# Patient Record
Sex: FEMALE | Race: ASIAN | NOT HISPANIC OR LATINO
[De-identification: names, ages, dates, MRNs, and addresses within clinical notes are randomized per-mention and may not be internally consistent; named-entity substitution may affect disease eponyms.]

---

## 2022-09-28 ENCOUNTER — APPOINTMENT (OUTPATIENT)
Dept: GASTROENTEROLOGY | Facility: CLINIC | Age: 57
End: 2022-09-28

## 2022-09-28 VITALS
WEIGHT: 245 LBS | SYSTOLIC BLOOD PRESSURE: 120 MMHG | HEIGHT: 68 IN | OXYGEN SATURATION: 98 % | HEART RATE: 75 BPM | DIASTOLIC BLOOD PRESSURE: 72 MMHG | TEMPERATURE: 97.1 F | BODY MASS INDEX: 37.13 KG/M2 | RESPIRATION RATE: 12 BRPM

## 2022-09-28 DIAGNOSIS — Z87.42 PERSONAL HISTORY OF OTHER DISEASES OF THE FEMALE GENITAL TRACT: ICD-10-CM

## 2022-09-28 DIAGNOSIS — Z86.79 PERSONAL HISTORY OF OTHER DISEASES OF THE CIRCULATORY SYSTEM: ICD-10-CM

## 2022-09-28 DIAGNOSIS — K64.8 OTHER HEMORRHOIDS: ICD-10-CM

## 2022-09-28 DIAGNOSIS — Z86.39 PERSONAL HISTORY OF OTHER ENDOCRINE, NUTRITIONAL AND METABOLIC DISEASE: ICD-10-CM

## 2022-09-28 PROCEDURE — 99204 OFFICE O/P NEW MOD 45 MIN: CPT

## 2022-09-28 RX ORDER — SODIUM SULFATE, POTASSIUM SULFATE AND MAGNESIUM SULFATE 1.6; 3.13; 17.5 G/177ML; G/177ML; G/177ML
17.5-3.13-1.6 SOLUTION ORAL
Qty: 1 | Refills: 0 | Status: ACTIVE | COMMUNITY
Start: 2022-09-28 | End: 1900-01-01

## 2022-09-28 NOTE — CONSULT LETTER
[Dear  ___] : Dear  [unfilled], [Consult Letter:] : I had the pleasure of evaluating your patient, [unfilled]. [Please see my note below.] : Please see my note below. [Consult Closing:] : Thank you very much for allowing me to participate in the care of this patient.  If you have any questions, please do not hesitate to contact me. [FreeTextEntry2] : Dr.Maroo Castro

## 2022-09-28 NOTE — PHYSICAL EXAM
[Normal] : heart rate was normal and rhythm regular, normal S1 and S2, no murmurs [No Masses] : no abdominal mass palpated [No Hernia] : no hernia [Ascites] : no ascites [Rebound Tenderness] : no rebound tenderness [de-identified] : Mild lower abdominal tenderness [de-identified] : Deferred

## 2022-09-28 NOTE — ASSESSMENT
[FreeTextEntry1] : 57-year-old female with history of acid reflux, history of peptic ulcer disease with bleeding duodenal ulcer resulting in melena, and history of rectal bleeding secondary to hemorrhoids complaining of lower abdominal tenderness and diarrhea.  We will schedule patient for both upper endoscopy and colonoscopy.  We will request medical clearance prior to procedures.

## 2022-09-28 NOTE — REVIEW OF SYSTEMS
[Abdominal Pain] : abdominal pain [Diarrhea] : diarrhea [Heartburn] : heartburn [Bloating (gassiness)] : bloating [Vomiting] : no vomiting [Constipation] : no constipation [Melena (black stool)] : no melena [Bleeding] : no bleeding [Fecal Incontinence (soiling)] : no fecal incontinence

## 2022-09-28 NOTE — REASON FOR VISIT
[Initial Evaluation] : an initial evaluation [FreeTextEntry1] : 57-year-old female presents with complaints of acid reflux and diarrhea

## 2022-09-28 NOTE — HISTORY OF PRESENT ILLNESS
[de-identified] : Upper endoscopy performed  approximately 10 years ago revealed a duodenal ulcer and a hiatal hernia.  Patient had melena at the time. [FreeTextEntry1] : Colonoscopy performed in [facility] approximately 10 years ago revealed hemorrhoids

## 2022-09-29 RX ORDER — SODIUM SULFATE, POTASSIUM SULFATE AND MAGNESIUM SULFATE 1.6; 3.13; 17.5 G/177ML; G/177ML; G/177ML
17.5-3.13-1.6 SOLUTION ORAL
Qty: 1 | Refills: 0 | Status: ACTIVE | COMMUNITY
Start: 2022-09-29 | End: 1900-01-01

## 2022-10-03 ENCOUNTER — APPOINTMENT (OUTPATIENT)
Dept: ORTHOPEDIC SURGERY | Facility: CLINIC | Age: 57
End: 2022-10-03

## 2022-10-03 VITALS — WEIGHT: 245 LBS | BODY MASS INDEX: 37.13 KG/M2 | HEIGHT: 68 IN

## 2022-10-03 DIAGNOSIS — Z00.00 ENCOUNTER FOR GENERAL ADULT MEDICAL EXAMINATION W/OUT ABNORMAL FINDINGS: ICD-10-CM

## 2022-10-03 DIAGNOSIS — M75.31 CALCIFIC TENDINITIS OF RIGHT SHOULDER: ICD-10-CM

## 2022-10-03 PROCEDURE — 99204 OFFICE O/P NEW MOD 45 MIN: CPT

## 2022-10-03 PROCEDURE — 73080 X-RAY EXAM OF ELBOW: CPT | Mod: RT

## 2022-10-03 PROCEDURE — 73010 X-RAY EXAM OF SHOULDER BLADE: CPT | Mod: RT

## 2022-10-03 PROCEDURE — 73030 X-RAY EXAM OF SHOULDER: CPT | Mod: RT

## 2022-10-03 NOTE — IMAGING
[Loose body] : Loose body [Right] : right elbow [There are no fractures, subluxations or dislocations. No significant abnormalities are seen] : There are no fractures, subluxations or dislocations. No significant abnormalities are seen [de-identified] : RIGHT SHOULDER\par No swelling, no ecchymosis, no deformity, no scapular winging.\par TTP TRAP AND LATERAL SHOULDER\par Forward flexion to 170; external rotation to 90 degrees (with shoulder abducted); internal rotation to 70 degrees (with shoulder abducted) WITH PAIN\par 5/5 supraspinatus, infraspinatus and subscapularis WITH DISCOMFORT\par Negative Ragsdale test, POSITIVE impingement sign, negative O’Norberto test.\par Speed’s and yergason negative\par Motor and sensory intact distally\par \par RIGHT ELBOW\par No swelling, no ecchymosis, no warmth, no fluctuance.\par MEDIAL AND LATERAL EPICONDYLE TTP\par Full elbow flexion, extension, supination, pronation\par 5/5 strength in flexion, extension, supination, pronation\par No Varus or Valgus instability\par Motor and sensory intact distally\par \par

## 2022-10-03 NOTE — ASSESSMENT
[FreeTextEntry1] : ATRAUMATIC RIGHT SHOULDER AND ELBOW PAIN, N/T IN HAND\par H/O RCR 2014\par XRAYS ELBOW NEGATIVE\par LOOSE BODYOR CALCIFIC DENSITY NOTED  ON SHOULDER XRAYS\par MRI RIGHT SHOULDER TO FURTHER EVAL LOOSE BODY/ RE-TEAR\par EMG TO FURTHER EVAL RADIC VS CARPAL TUNNEL AS SHE REPORTS WEAKNESS IN BOTH HANDS\par MDP DISCUSSED, PT DECLINED\par RTO P IMAGING/EMG

## 2022-10-03 NOTE — HISTORY OF PRESENT ILLNESS
[Gradual] : gradual [9] : 9 [8] : 8 [Localized] : localized [Intermittent] : intermittent [Nothing helps with pain getting better] : Nothing helps with pain getting better [Lying in bed] : lying in bed [de-identified] : 10/03/2022 pt presents here today with right shoulders pain\par h/o right shoulder rct repair  2014 june \par pain also in right elbow\par has N/T in fingers and feels she is losing dexterity and drops things often [] : no [FreeTextEntry1] : bilateral shoulders  [de-identified] : Dr Mitchell [de-identified] : sx

## 2022-10-22 ENCOUNTER — APPOINTMENT (OUTPATIENT)
Dept: MRI IMAGING | Facility: CLINIC | Age: 57
End: 2022-10-22

## 2022-10-28 ENCOUNTER — FORM ENCOUNTER (OUTPATIENT)
Age: 57
End: 2022-10-28

## 2022-10-29 ENCOUNTER — APPOINTMENT (OUTPATIENT)
Dept: MRI IMAGING | Facility: CLINIC | Age: 57
End: 2022-10-29

## 2022-10-29 ENCOUNTER — APPOINTMENT (OUTPATIENT)
Dept: MAMMOGRAPHY | Facility: CLINIC | Age: 57
End: 2022-10-29

## 2022-10-29 PROCEDURE — 77067 SCR MAMMO BI INCL CAD: CPT

## 2022-10-29 PROCEDURE — 73221 MRI JOINT UPR EXTREM W/O DYE: CPT | Mod: RT

## 2022-10-29 PROCEDURE — 77063 BREAST TOMOSYNTHESIS BI: CPT

## 2022-11-08 ENCOUNTER — APPOINTMENT (OUTPATIENT)
Dept: NEUROLOGY | Facility: CLINIC | Age: 57
End: 2022-11-08

## 2022-11-08 DIAGNOSIS — R20.0 ANESTHESIA OF SKIN: ICD-10-CM

## 2022-11-08 DIAGNOSIS — M54.12 RADICULOPATHY, CERVICAL REGION: ICD-10-CM

## 2022-11-08 DIAGNOSIS — R20.2 ANESTHESIA OF SKIN: ICD-10-CM

## 2022-11-08 PROCEDURE — 95886 MUSC TEST DONE W/N TEST COMP: CPT

## 2022-11-08 PROCEDURE — 95912 NRV CNDJ TEST 11-12 STUDIES: CPT

## 2022-11-11 ENCOUNTER — APPOINTMENT (OUTPATIENT)
Dept: ORTHOPEDIC SURGERY | Facility: CLINIC | Age: 57
End: 2022-11-11

## 2022-11-11 VITALS — HEIGHT: 68 IN | BODY MASS INDEX: 37.13 KG/M2 | WEIGHT: 245 LBS

## 2022-11-11 DIAGNOSIS — Z98.890 OTHER SPECIFIED POSTPROCEDURAL STATES: ICD-10-CM

## 2022-11-11 PROCEDURE — 99213 OFFICE O/P EST LOW 20 MIN: CPT

## 2022-11-11 NOTE — HISTORY OF PRESENT ILLNESS
[Gradual] : gradual [9] : 9 [8] : 8 [Localized] : localized [Intermittent] : intermittent [Nothing helps with pain getting better] : Nothing helps with pain getting better [Lying in bed] : lying in bed [de-identified] : 10/03/2022 pt presents here today with right shoulders pain\par h/o right shoulder rct repair  2014 june \par pain also in right elbow\par has N/T in fingers and feels she is losing dexterity and drops things often [] : no [FreeTextEntry1] : bilateral shoulders  [de-identified] : Dr Mitchell [de-identified] : sx

## 2022-11-11 NOTE — ASSESSMENT
[FreeTextEntry1] : reviewed shoulder mri, no re-tear, nothing focal noted\par Advised consultation with hand team regarding CTS, persistent n/t and also dropping things\par f/u PRN for shoulder

## 2022-11-11 NOTE — IMAGING
[Loose body] : Loose body [Right] : right elbow [There are no fractures, subluxations or dislocations. No significant abnormalities are seen] : There are no fractures, subluxations or dislocations. No significant abnormalities are seen [de-identified] : RIGHT SHOULDER\par No swelling, no ecchymosis, no deformity, no scapular winging.\par TTP TRAP AND LATERAL SHOULDER\par Forward flexion to 170; external rotation to 90 degrees (with shoulder abducted); internal rotation to 70 degrees (with shoulder abducted) WITH PAIN\par 5/5 supraspinatus, infraspinatus and subscapularis WITH DISCOMFORT\par Negative Ragsdale test, POSITIVE impingement sign, negative O’Norberto test.\par Speed’s and yergason negative\par Motor and sensory intact distally\par +TINEL wirst, +spurling\par

## 2022-12-02 ENCOUNTER — APPOINTMENT (OUTPATIENT)
Dept: ORTHOPEDIC SURGERY | Facility: CLINIC | Age: 57
End: 2022-12-02

## 2022-12-02 VITALS — BODY MASS INDEX: 37.13 KG/M2 | WEIGHT: 245 LBS | HEIGHT: 68 IN

## 2022-12-02 DIAGNOSIS — G56.01 CARPAL TUNNEL SYNDROME, RIGHT UPPER LIMB: ICD-10-CM

## 2022-12-02 DIAGNOSIS — G56.02 CARPAL TUNNEL SYNDROME, LEFT UPPER LIMB: ICD-10-CM

## 2022-12-02 PROCEDURE — 99214 OFFICE O/P EST MOD 30 MIN: CPT

## 2022-12-02 NOTE — DISCUSSION/SUMMARY
[Surgical risks reviewed] : Surgical risks reviewed [de-identified] : Patient wished to proceed with bilateral CTR.  Will be scheduled for LET CTR, 3 weeks later RIGHT CTR

## 2022-12-02 NOTE — HISTORY OF PRESENT ILLNESS
[5] : 5 [9] : 9 [Dull/Aching] : dull/aching [Localized] : localized [Sharp] : sharp [Full time] : Work status: full time [de-identified] : 57 year old female with pain, numbness and stiffness for a few years.  Gradual builkd up.  NO specific trauma.   SYmptoms are worse at night.  SLeep is interrupted.  Has tried splinting at night for a few months.  Not much help.  Symptoms are intermittent.    [] : Post Surgical Visit: no [FreeTextEntry1] : B/L Hands [FreeTextEntry3] : N/A Chronic [FreeTextEntry5] : 58 Y/O RHD F eval B/L Hands Attarumatic chronic pain unknown DX prior TX of XR and EMG by Dr ACE Saldivar  [de-identified] : 11/11/22 [de-identified] : Dr ACE Saldivar  [de-identified] : XR EMG  [de-identified] : XR EMG  [de-identified] :

## 2023-01-02 ENCOUNTER — NON-APPOINTMENT (OUTPATIENT)
Age: 58
End: 2023-01-02

## 2023-01-04 ENCOUNTER — APPOINTMENT (OUTPATIENT)
Age: 58
End: 2023-01-04

## 2023-01-17 ENCOUNTER — APPOINTMENT (OUTPATIENT)
Dept: ORTHOPEDIC SURGERY | Facility: CLINIC | Age: 58
End: 2023-01-17

## 2023-06-13 ENCOUNTER — APPOINTMENT (OUTPATIENT)
Dept: OBGYN | Facility: CLINIC | Age: 58
End: 2023-06-13

## 2023-06-16 ENCOUNTER — NON-APPOINTMENT (OUTPATIENT)
Age: 58
End: 2023-06-16

## 2023-07-26 ENCOUNTER — APPOINTMENT (OUTPATIENT)
Dept: GASTROENTEROLOGY | Facility: CLINIC | Age: 58
End: 2023-07-26
Payer: COMMERCIAL

## 2023-07-26 VITALS
SYSTOLIC BLOOD PRESSURE: 160 MMHG | HEART RATE: 78 BPM | HEIGHT: 68 IN | OXYGEN SATURATION: 99 % | DIASTOLIC BLOOD PRESSURE: 92 MMHG | RESPIRATION RATE: 12 BRPM | TEMPERATURE: 97.3 F | WEIGHT: 293 LBS | BODY MASS INDEX: 44.41 KG/M2

## 2023-07-26 DIAGNOSIS — K21.9 GASTRO-ESOPHAGEAL REFLUX DISEASE W/OUT ESOPHAGITIS: ICD-10-CM

## 2023-07-26 DIAGNOSIS — R10.9 UNSPECIFIED ABDOMINAL PAIN: ICD-10-CM

## 2023-07-26 DIAGNOSIS — Z12.11 ENCOUNTER FOR SCREENING FOR MALIGNANT NEOPLASM OF COLON: ICD-10-CM

## 2023-07-26 PROCEDURE — 99213 OFFICE O/P EST LOW 20 MIN: CPT

## 2023-07-26 RX ORDER — SODIUM SULFATE, POTASSIUM SULFATE AND MAGNESIUM SULFATE 1.6; 3.13; 17.5 G/177ML; G/177ML; G/177ML
17.5-3.13-1.6 SOLUTION ORAL
Qty: 1 | Refills: 0 | Status: ACTIVE | COMMUNITY
Start: 2023-07-26 | End: 1900-01-01

## 2023-07-26 NOTE — REASON FOR VISIT
[Follow-up] : a follow-up of an existing diagnosis [FreeTextEntry1] : Patient is a 58-year-old female complaining of right flank pain and diarrhea

## 2023-07-26 NOTE — REVIEW OF SYSTEMS
[Abdominal Pain] : abdominal pain [Diarrhea] : diarrhea [Bloating (gassiness)] : bloating [Dizziness] : dizziness [Vomiting] : no vomiting [Constipation] : no constipation [Melena (black stool)] : no melena [Bleeding] : no bleeding [Fecal Incontinence (soiling)] : no fecal incontinence

## 2023-07-26 NOTE — HISTORY OF PRESENT ILLNESS
[FreeTextEntry1] : Colonoscopy performed in [facility] approximately 10 years [de-identified] : CAT scan of the abdomen and pelvis performed June 17, 2023 at [facility] in [facility] revealed a possible punctate gallstone and fluid-filled colon suggestive of a diarrheal state.  Otherwise no acute abdominal pelvic findings

## 2023-07-26 NOTE — PHYSICAL EXAM
[Alert] : alert [Normal Voice/Communication] : normal voice/communication [Healthy Appearing] : healthy appearing [No Acute Distress] : no acute distress [Well Developed] : well developed [Well Nourished] : well nourished [Obese (BMI >= 30)] : obese (BMI >= 30) [Normal] : heart rate was normal and rhythm regular, normal S1 and S2, no murmurs [None] : no edema [No Hernia] : no hernia [RUQ] : RUQ [Ascites: ___] : no ascites [Rebound Tenderness] : no rebound tenderness [de-identified] : Deferred

## 2023-07-26 NOTE — ASSESSMENT
[FreeTextEntry1] : 58-year-old female with history of gastroesophageal reflux disease, previously seen and scheduled for both an upper endoscopy and colonoscopy in September 2022 but never scheduled.  Patient recently had diarrhea and right-sided flank pain with abdominal pelvic CAT scan revealing a punctate gallstone and stool studies being negative.  Patient is a morbidly obese weighing 339 pounds.  We will request presurgical testing prior to scheduling patient for upper endoscopy and colonoscopy as well as obtaining medical clearance from Dr. Eden Castro.

## 2023-07-28 ENCOUNTER — OUTPATIENT (OUTPATIENT)
Dept: OUTPATIENT SERVICES | Facility: HOSPITAL | Age: 58
LOS: 1 days | End: 2023-07-28
Payer: COMMERCIAL

## 2023-07-28 VITALS
TEMPERATURE: 97 F | OXYGEN SATURATION: 100 % | SYSTOLIC BLOOD PRESSURE: 150 MMHG | DIASTOLIC BLOOD PRESSURE: 76 MMHG | HEIGHT: 68 IN | RESPIRATION RATE: 16 BRPM | HEART RATE: 64 BPM | WEIGHT: 293 LBS

## 2023-07-28 DIAGNOSIS — Z98.890 OTHER SPECIFIED POSTPROCEDURAL STATES: Chronic | ICD-10-CM

## 2023-07-28 DIAGNOSIS — K21.9 GASTRO-ESOPHAGEAL REFLUX DISEASE WITHOUT ESOPHAGITIS: ICD-10-CM

## 2023-07-28 DIAGNOSIS — Z12.11 ENCOUNTER FOR SCREENING FOR MALIGNANT NEOPLASM OF COLON: ICD-10-CM

## 2023-07-28 DIAGNOSIS — N83.209 UNSPECIFIED OVARIAN CYST, UNSPECIFIED SIDE: Chronic | ICD-10-CM

## 2023-07-28 DIAGNOSIS — R19.7 DIARRHEA, UNSPECIFIED: ICD-10-CM

## 2023-07-28 DIAGNOSIS — Z01.818 ENCOUNTER FOR OTHER PREPROCEDURAL EXAMINATION: ICD-10-CM

## 2023-07-28 LAB
ALBUMIN SERPL ELPH-MCNC: 3.3 G/DL — SIGNIFICANT CHANGE UP (ref 3.3–5)
ALP SERPL-CCNC: 76 U/L — SIGNIFICANT CHANGE UP (ref 40–120)
ALT FLD-CCNC: 24 U/L — SIGNIFICANT CHANGE UP (ref 12–78)
ANION GAP SERPL CALC-SCNC: 5 MMOL/L — SIGNIFICANT CHANGE UP (ref 5–17)
AST SERPL-CCNC: 12 U/L — LOW (ref 15–37)
BILIRUB SERPL-MCNC: 0.2 MG/DL — SIGNIFICANT CHANGE UP (ref 0.2–1.2)
BUN SERPL-MCNC: 12 MG/DL — SIGNIFICANT CHANGE UP (ref 7–23)
CALCIUM SERPL-MCNC: 9.7 MG/DL — SIGNIFICANT CHANGE UP (ref 8.5–10.1)
CHLORIDE SERPL-SCNC: 110 MMOL/L — HIGH (ref 96–108)
CO2 SERPL-SCNC: 26 MMOL/L — SIGNIFICANT CHANGE UP (ref 22–31)
CREAT SERPL-MCNC: 0.89 MG/DL — SIGNIFICANT CHANGE UP (ref 0.5–1.3)
EGFR: 76 ML/MIN/1.73M2 — SIGNIFICANT CHANGE UP
GLUCOSE SERPL-MCNC: 114 MG/DL — HIGH (ref 70–99)
HCT VFR BLD CALC: 31.7 % — LOW (ref 34.5–45)
HGB BLD-MCNC: 9.4 G/DL — LOW (ref 11.5–15.5)
MCHC RBC-ENTMCNC: 24.2 PG — LOW (ref 27–34)
MCHC RBC-ENTMCNC: 29.7 GM/DL — LOW (ref 32–36)
MCV RBC AUTO: 81.5 FL — SIGNIFICANT CHANGE UP (ref 80–100)
NRBC # BLD: 0 /100 WBCS — SIGNIFICANT CHANGE UP (ref 0–0)
PLATELET # BLD AUTO: 354 K/UL — SIGNIFICANT CHANGE UP (ref 150–400)
POTASSIUM SERPL-MCNC: 4.6 MMOL/L — SIGNIFICANT CHANGE UP (ref 3.5–5.3)
POTASSIUM SERPL-SCNC: 4.6 MMOL/L — SIGNIFICANT CHANGE UP (ref 3.5–5.3)
PROT SERPL-MCNC: 7.4 G/DL — SIGNIFICANT CHANGE UP (ref 6–8.3)
RBC # BLD: 3.89 M/UL — SIGNIFICANT CHANGE UP (ref 3.8–5.2)
RBC # FLD: 18.6 % — HIGH (ref 10.3–14.5)
SODIUM SERPL-SCNC: 141 MMOL/L — SIGNIFICANT CHANGE UP (ref 135–145)
WBC # BLD: 6.3 K/UL — SIGNIFICANT CHANGE UP (ref 3.8–10.5)
WBC # FLD AUTO: 6.3 K/UL — SIGNIFICANT CHANGE UP (ref 3.8–10.5)

## 2023-07-28 PROCEDURE — 36415 COLL VENOUS BLD VENIPUNCTURE: CPT

## 2023-07-28 PROCEDURE — 83036 HEMOGLOBIN GLYCOSYLATED A1C: CPT

## 2023-07-28 PROCEDURE — G0463: CPT

## 2023-07-28 PROCEDURE — 93005 ELECTROCARDIOGRAM TRACING: CPT

## 2023-07-28 PROCEDURE — 80053 COMPREHEN METABOLIC PANEL: CPT

## 2023-07-28 PROCEDURE — 85027 COMPLETE CBC AUTOMATED: CPT

## 2023-07-28 PROCEDURE — 93010 ELECTROCARDIOGRAM REPORT: CPT

## 2023-07-28 RX ORDER — GLIMEPIRIDE 1 MG
1 TABLET ORAL
Refills: 0 | DISCHARGE

## 2023-07-28 RX ORDER — ALBUTEROL 90 UG/1
2 AEROSOL, METERED ORAL
Refills: 0 | DISCHARGE

## 2023-07-28 RX ORDER — METOPROLOL TARTRATE 50 MG
1 TABLET ORAL
Refills: 0 | DISCHARGE

## 2023-07-28 RX ORDER — METFORMIN HYDROCHLORIDE 850 MG/1
1 TABLET ORAL
Refills: 0 | DISCHARGE

## 2023-07-28 RX ORDER — OMEPRAZOLE 10 MG/1
1 CAPSULE, DELAYED RELEASE ORAL
Refills: 0 | DISCHARGE

## 2023-07-28 NOTE — H&P PST ADULT - NSICDXFAMILYHX_GEN_ALL_CORE_FT
FAMILY HISTORY:  Father  Still living? Unknown  FH: type 2 diabetes, Age at diagnosis: Age Unknown    Mother  Still living? Unknown  FH: type 2 diabetes, Age at diagnosis: Age Unknown    Sibling  Still living? Unknown  FH: type 2 diabetes, Age at diagnosis: Age Unknown  FH: type 2 diabetes, Age at diagnosis: Age Unknown    Grandparent  Still living? Unknown  FH: asthma, Age at diagnosis: Age Unknown  FH: type 2 diabetes, Age at diagnosis: Age Unknown  FH: asthma, Age at diagnosis: Age Unknown  FH: type 2 diabetes, Age at diagnosis: Age Unknown    Uncle  Still living? Unknown  FH: leukemia, Age at diagnosis: Age Unknown

## 2023-07-28 NOTE — H&P PST ADULT - GASTROINTESTINAL
negative generalized mild tenderness over LLQ & RLQ/normal/soft/nondistended/normal active bowel sounds

## 2023-07-28 NOTE — H&P PST ADULT - NSICDXPASTSURGICALHX_GEN_ALL_CORE_FT
PAST SURGICAL HISTORY:  Benign ovarian cyst     History of abdominal surgery     History of arthroscopy of right shoulder

## 2023-07-28 NOTE — H&P PST ADULT - PROBLEM SELECTOR PLAN 4
Labs CBC, CMP, A1C, and EKG   Medical clearance necessary  Pre op instructions reviewed and given.   Anesthesia consult with Dr. Horta  Take routine am meds with sip of water.   A1c pending. Patient advised that she would need endo clearance if A1c came back 9 or above.   DM per sheet.  Verbalized understanding. Fingerstick am of surgery.  Instructed to hold and/or avoid other NSAIDs and OTC supplements. Tylenol is ok. Verbalized understanding

## 2023-07-28 NOTE — H&P PST ADULT - NSICDXPROCEDURE_GEN_ALL_CORE_FT
PROCEDURES:  EGD with biopsy 28-Jul-2023 16:21:59 Transoral Single/Multiple Alison Calix  Diagnostic flexible colonoscopy 28-Jul-2023 16:22:29 Dx w/collj spec when performed Alison Calix

## 2023-07-28 NOTE — H&P PST ADULT - NSICDXPASTMEDICALHX_GEN_ALL_CORE_FT
PAST MEDICAL HISTORY:  Alternating constipation and diarrhea     Endometriosis     GERD (gastroesophageal reflux disease)     Hypertension     Kidney stones     Migraine     Morbid obesity     Reactive airway disease with wheezing     Type 2 diabetes mellitus

## 2023-07-28 NOTE — H&P PST ADULT - HISTORY OF PRESENT ILLNESS
57 y/o female with PMH of HTN, DM2, Morbid Obesity (BMI 50.3), Kidney Stones, Asthma, GERD, Diarrhea, & Endometriosis and SHx Right Shoulder Arthroscopy & Laparoscopic Abdomen Surgery to remove endometrial tissue (2010) for PST having Upper Endoscopy/Colonoscopy by Dr. Beauchamp on 8/3/2023.  She reports chronic diarrhea since 6/3/2023, having 5 movements daily. Denies any nausea or vomiting.  Seen by provider and recommended for elective procedure.

## 2023-07-29 LAB
A1C WITH ESTIMATED AVERAGE GLUCOSE RESULT: 7.5 % — HIGH (ref 4–5.6)
ESTIMATED AVERAGE GLUCOSE: 169 MG/DL — HIGH (ref 68–114)

## 2023-08-02 ENCOUNTER — TRANSCRIPTION ENCOUNTER (OUTPATIENT)
Age: 58
End: 2023-08-02

## 2023-08-03 ENCOUNTER — OUTPATIENT (OUTPATIENT)
Dept: OUTPATIENT SERVICES | Facility: HOSPITAL | Age: 58
LOS: 1 days | End: 2023-08-03
Payer: COMMERCIAL

## 2023-08-03 ENCOUNTER — APPOINTMENT (OUTPATIENT)
Dept: GASTROENTEROLOGY | Facility: HOSPITAL | Age: 58
End: 2023-08-03

## 2023-08-03 ENCOUNTER — RESULT REVIEW (OUTPATIENT)
Age: 58
End: 2023-08-03

## 2023-08-03 DIAGNOSIS — Z98.890 OTHER SPECIFIED POSTPROCEDURAL STATES: Chronic | ICD-10-CM

## 2023-08-03 DIAGNOSIS — Z12.11 ENCOUNTER FOR SCREENING FOR MALIGNANT NEOPLASM OF COLON: ICD-10-CM

## 2023-08-03 DIAGNOSIS — N83.209 UNSPECIFIED OVARIAN CYST, UNSPECIFIED SIDE: Chronic | ICD-10-CM

## 2023-08-03 DIAGNOSIS — R19.7 DIARRHEA, UNSPECIFIED: ICD-10-CM

## 2023-08-03 DIAGNOSIS — K21.9 GASTRO-ESOPHAGEAL REFLUX DISEASE WITHOUT ESOPHAGITIS: ICD-10-CM

## 2023-08-03 PROCEDURE — 88312 SPECIAL STAINS GROUP 1: CPT | Mod: 26

## 2023-08-03 PROCEDURE — 43239 EGD BIOPSY SINGLE/MULTIPLE: CPT

## 2023-08-03 PROCEDURE — 45380 COLONOSCOPY AND BIOPSY: CPT

## 2023-08-03 PROCEDURE — 88305 TISSUE EXAM BY PATHOLOGIST: CPT | Mod: 26

## 2023-08-03 PROCEDURE — 88313 SPECIAL STAINS GROUP 2: CPT | Mod: 26

## 2023-08-03 PROCEDURE — 88312 SPECIAL STAINS GROUP 1: CPT

## 2023-08-03 PROCEDURE — 45385 COLONOSCOPY W/LESION REMOVAL: CPT | Mod: 59

## 2023-08-03 PROCEDURE — 45380 COLONOSCOPY AND BIOPSY: CPT | Mod: 59

## 2023-08-03 PROCEDURE — 88305 TISSUE EXAM BY PATHOLOGIST: CPT

## 2023-08-03 PROCEDURE — 88313 SPECIAL STAINS GROUP 2: CPT

## 2023-08-03 PROCEDURE — 82962 GLUCOSE BLOOD TEST: CPT

## 2023-08-04 LAB — SURGICAL PATHOLOGY STUDY: SIGNIFICANT CHANGE UP

## 2023-08-05 ENCOUNTER — LABORATORY RESULT (OUTPATIENT)
Age: 58
End: 2023-08-05

## 2023-08-08 PROBLEM — J45.909 UNSPECIFIED ASTHMA, UNCOMPLICATED: Chronic | Status: ACTIVE | Noted: 2023-07-28

## 2023-08-08 PROBLEM — I10 ESSENTIAL (PRIMARY) HYPERTENSION: Chronic | Status: ACTIVE | Noted: 2023-07-28

## 2023-08-08 PROBLEM — N80.9 ENDOMETRIOSIS, UNSPECIFIED: Chronic | Status: ACTIVE | Noted: 2023-07-28

## 2023-08-08 PROBLEM — G43.909 MIGRAINE, UNSPECIFIED, NOT INTRACTABLE, WITHOUT STATUS MIGRAINOSUS: Chronic | Status: ACTIVE | Noted: 2023-07-28

## 2023-08-08 PROBLEM — E11.9 TYPE 2 DIABETES MELLITUS WITHOUT COMPLICATIONS: Chronic | Status: ACTIVE | Noted: 2023-07-28

## 2023-08-08 PROBLEM — R19.8 OTHER SPECIFIED SYMPTOMS AND SIGNS INVOLVING THE DIGESTIVE SYSTEM AND ABDOMEN: Chronic | Status: ACTIVE | Noted: 2023-07-28

## 2023-08-08 PROBLEM — E66.01 MORBID (SEVERE) OBESITY DUE TO EXCESS CALORIES: Chronic | Status: ACTIVE | Noted: 2023-07-28

## 2023-08-08 PROBLEM — N20.0 CALCULUS OF KIDNEY: Chronic | Status: ACTIVE | Noted: 2023-07-28

## 2023-08-08 PROBLEM — K21.9 GASTRO-ESOPHAGEAL REFLUX DISEASE WITHOUT ESOPHAGITIS: Chronic | Status: ACTIVE | Noted: 2023-07-28

## 2023-08-14 DIAGNOSIS — D50.9 IRON DEFICIENCY ANEMIA, UNSPECIFIED: ICD-10-CM

## 2023-08-14 LAB
FERRITIN SERPL-MCNC: 7 NG/ML
FOLATE SERPL-MCNC: 5.9 NG/ML
HCT VFR BLD CALC: 34.3 %
HGB A MFR BLD: 97.8 %
HGB A2 MFR BLD: 2.2 %
HGB BLD-MCNC: 9.8 G/DL
HGB FRACT BLD-IMP: NORMAL
IRON SATN MFR SERPL: 11 %
IRON SERPL-MCNC: 38 UG/DL
MCHC RBC-ENTMCNC: 24.9 PG
MCHC RBC-ENTMCNC: 28.6 GM/DL
MCV RBC AUTO: 87.3 FL
PLATELET # BLD AUTO: 350 K/UL
RBC # BLD: 3.93 M/UL
RBC # FLD: 18.8 %
TIBC SERPL-MCNC: 352 UG/DL
UIBC SERPL-MCNC: 314 UG/DL
VIT B12 SERPL-MCNC: 266 PG/ML
WBC # FLD AUTO: 5.65 K/UL

## 2023-08-14 RX ORDER — FERROUS SULFATE TAB EC 324 MG (65 MG FE EQUIVALENT) 324 (65 FE) MG
324 (65 FE) TABLET DELAYED RESPONSE ORAL
Qty: 90 | Refills: 5 | Status: ACTIVE | COMMUNITY
Start: 2023-08-14 | End: 1900-01-01

## 2023-08-16 LAB — CELIACPAN: NORMAL

## 2023-08-23 ENCOUNTER — APPOINTMENT (OUTPATIENT)
Dept: GASTROENTEROLOGY | Facility: CLINIC | Age: 58
End: 2023-08-23
Payer: COMMERCIAL

## 2023-08-23 VITALS
HEART RATE: 60 BPM | BODY MASS INDEX: 44.41 KG/M2 | WEIGHT: 293 LBS | SYSTOLIC BLOOD PRESSURE: 142 MMHG | TEMPERATURE: 96.2 F | OXYGEN SATURATION: 96 % | DIASTOLIC BLOOD PRESSURE: 80 MMHG | HEIGHT: 68 IN | RESPIRATION RATE: 12 BRPM

## 2023-08-23 DIAGNOSIS — R19.7 DIARRHEA, UNSPECIFIED: ICD-10-CM

## 2023-08-23 DIAGNOSIS — R10.30 LOWER ABDOMINAL PAIN, UNSPECIFIED: ICD-10-CM

## 2023-08-23 DIAGNOSIS — Z87.898 PERSONAL HISTORY OF OTHER SPECIFIED CONDITIONS: ICD-10-CM

## 2023-08-23 DIAGNOSIS — K26.4 CHRONIC OR UNSPECIFIED DUODENAL ULCER WITH HEMORRHAGE: ICD-10-CM

## 2023-08-23 DIAGNOSIS — K44.9 DIAPHRAGMATIC HERNIA W/OUT OBSTRUCTION OR GANGRENE: ICD-10-CM

## 2023-08-23 DIAGNOSIS — K31.89 OTHER DISEASES OF STOMACH AND DUODENUM: ICD-10-CM

## 2023-08-23 DIAGNOSIS — D13.1 BENIGN NEOPLASM OF STOMACH: ICD-10-CM

## 2023-08-23 DIAGNOSIS — D64.9 ANEMIA, UNSPECIFIED: ICD-10-CM

## 2023-08-23 PROCEDURE — 99213 OFFICE O/P EST LOW 20 MIN: CPT

## 2023-08-26 NOTE — REASON FOR VISIT
[Follow-up] : a follow-up of an existing diagnosis [FreeTextEntry1] : 58-year-old [place] female presents for follow-up to review findings of upper endoscopy and colonoscopy performed August 3, 2023

## 2023-08-26 NOTE — REVIEW OF SYSTEMS
[Abdominal Pain] : abdominal pain [Diarrhea] : diarrhea [Heartburn] : heartburn [Bloating (gassiness)] : bloating [Melena (black stool)] : no melena [Fecal Incontinence (soiling)] : no fecal incontinence

## 2023-08-26 NOTE — HISTORY OF PRESENT ILLNESS
[de-identified] : Upper endoscopy performed August 3, 2023 revealed a hiatal hernia and a fundic gland polyp.  There was reactive gastropathy in both the body and the antrum of the stomach but no evidence for Helicobacter pylori.  Biopsies of the distal esophagus were negative for reflux esophagitis [FreeTextEntry1] : Colonoscopy performed August 3, 2023 revealed an inflammatory polyp of the hepatic flexure.  Biopsies of the right colon transverse colon left colon sigmoid colon and rectum were negative for inflammation.  Terminal ileum was not entered.

## 2023-08-26 NOTE — ASSESSMENT
[FreeTextEntry1] : 58-year-old female complaining of diarrhea and lower abdominal tenderness.  Patient had a history of a duodenal ulcer in the past.  Upper endoscopy revealed a hiatal hernia and fundic gland polyp as well as reactive gastropathy but no evidence for Helicobacter pylori.  Patient claims her diarrhea is better while using Kefir but she is still complaining of lower abdominal tenderness.  Colonoscopy revealed an inflammatory polyp but no adenomatous polyps.  Have suggested repeat colonoscopy in 10 years.  We will try to obtain abdominal pelvic CAT scan that was done at [facility] back on June 17, 2023 and review.  Patient will resume omeprazole after she has done her H. pylori breath test.

## 2023-08-26 NOTE — ADDENDUM
[FreeTextEntry1] : Continue Kefir Obtain H. pylori urea breath test Continue ferrous sulfate Obtain and review previous abdominal pelvic CAT scan done at [facility] on June 17, 2023 A/P Ct scan from [facility] dated June 17,2023 reviewed with patient on 8/24/2023

## 2023-08-26 NOTE — PHYSICAL EXAM
[Normal] : heart rate was normal and rhythm regular, normal S1 and S2, no murmurs [Bowel Sounds] : normal bowel sounds [Abdomen Soft] : soft [] : no hepatosplenomegaly [Other: ___] : [unfilled] [Ascites: ___] : no ascites [Rebound Tenderness] : no rebound tenderness

## 2023-08-31 LAB — UREA BREATH TEST QL: NEGATIVE

## 2025-02-17 NOTE — H&P PST ADULT - BP NONINVASIVE SYSTOLIC (MM HG)
Called and left detailed VM for pt that she is scheduled for a follow up visit with Dr. Johnson on Wednesday 2/19 at 3pm in the breast clinic to discuss MRI results and finalize surgery plan.    Requested pt call back to confirm appt. Await her return call.    2/18, 9:16am- No return call received from patient. Second message left to call back and confirm tomorrow's appt with Dr. Johnson. Await return call.    12:36pm-Attempted to reach Renea again and was able to get ahold of her. Confirmed appt with Dr. Johnson tomorrow at 3pm. She didn't sleep well last night, was anxious for breast MRI this morning. Support provided, no further questions at this time.   150

## (undated) DEVICE — FORCEP RADIAL JAW 4 JUMBO 2.8MM 3.2MM 240CM ORANGE DISP

## (undated) DEVICE — SET IV PUMP BLOOD 1VALVE 180FILTER NON-DEHP

## (undated) DEVICE — SNARE POLYP SENS 27MM 240CM

## (undated) DEVICE — SNARE LRG

## (undated) DEVICE — TUBING CANNULA SALTER LABS NASAL ADULT 7FT

## (undated) DEVICE — SUT HEWSON RETRIEVER

## (undated) DEVICE — SYR ALLIANCE II INFLATION 60ML

## (undated) DEVICE — RADIAL JAW 4 LG CAPACITY WITH NDL

## (undated) DEVICE — TUBING SUCTION CONN 6FT STERILE

## (undated) DEVICE — POLY TRAP ETRAP

## (undated) DEVICE — BRUSH COLONOSCOPY CYTOLOGY

## (undated) DEVICE — SNARE CAPTIVATOR COLD RND STIFF 10X2.4X2.8MM 240CM

## (undated) DEVICE — BITE BLOCK MAXI RUBBER STAMP

## (undated) DEVICE — ENDOCUFF VISION SZ 2 LG GRN

## (undated) DEVICE — FORCEP RADIAL JAW 4 W NDL 2.4MM 2.8MM 240CM ORANGE DISP

## (undated) DEVICE — CATH ELCTR GLIDE PRB 7FR

## (undated) DEVICE — TRAP QUICK CATCH  SINGL CHAMBER

## (undated) DEVICE — DRSG CURITY GAUZE SPONGE 4 X 4" 12-PLY

## (undated) DEVICE — CANISTER SUCTION 1200CC 10/SL

## (undated) DEVICE — CATH ELECHMSTAT  INJ 7FR 210CM

## (undated) DEVICE — TUBING IV SET SECOND 34" W/O LOK-BLUNT

## (undated) DEVICE — VALVE BIOPSY

## (undated) DEVICE — TUBE RECTAL 24FR

## (undated) DEVICE — PACK IV START WITH CHG

## (undated) DEVICE — MASK OXYGEN PANORAMIC

## (undated) DEVICE — TRAP SPECIMEN SPUTUM 40CC

## (undated) DEVICE — MASK O2 NON REBREATH 3IN1 ADULT

## (undated) DEVICE — SYR IV POSIFLUSH NS 3ML 30/TY

## (undated) DEVICE — CLAMP BX HOT RAD JAW 3

## (undated) DEVICE — CATH IV SAFE BC 20G X 1.16" (PINK)

## (undated) DEVICE — TUBE O2 SUPL CRUSH RESIS CONN SOUTHSIDE ONLY

## (undated) DEVICE — STERIS DEFENDO 3-PIECE KIT (AIR/WATER, SUCTION & BIOPSY VALVES)

## (undated) DEVICE — SOL IRR POUR H2O 500ML

## (undated) DEVICE — SENSOR O2 FINGER XL ADULT 24/BX 6BX/CA

## (undated) DEVICE — Device

## (undated) DEVICE — ENDOCUFF VISION SZ 3 SM PRPL

## (undated) DEVICE — SOL IRR NS 0.9% 250ML

## (undated) DEVICE — BRUSH CYTO ENDO

## (undated) DEVICE — SYR LUER SLIP TIP 50CC

## (undated) DEVICE — CATH IV SAFE BC 22G X 1" (BLUE)

## (undated) DEVICE — NDL INJ SCLERO INTERJECT 23G

## (undated) DEVICE — MARKER ENDO SPOT EX

## (undated) DEVICE — SUCTION YANKAUER TAPERED BULBOUS NO VENT

## (undated) DEVICE — RETRIEVER ROTH NET PLATINUM-UNIVERSAL

## (undated) DEVICE — FORCEP RADIAL JAW 4 W NDL 2.2MM 2.8MM 160CM YELLOW DISP

## (undated) DEVICE — TUBING IV SET SECONDARY 34"

## (undated) DEVICE — FORMALIN CUPS 10% BUFFERED

## (undated) DEVICE — ELCTR GROUNDING PAD ADULT COVIDIEN

## (undated) DEVICE — TUBING IV SET GRAVITY 3Y 100" MACRO

## (undated) DEVICE — SYR LUER SLIP TIP 30CC

## (undated) DEVICE — SYR LUER LOK 30CC